# Patient Record
Sex: MALE | Race: WHITE | NOT HISPANIC OR LATINO | URBAN - METROPOLITAN AREA
[De-identification: names, ages, dates, MRNs, and addresses within clinical notes are randomized per-mention and may not be internally consistent; named-entity substitution may affect disease eponyms.]

---

## 2022-12-25 ENCOUNTER — HOSPITAL ENCOUNTER (EMERGENCY)
Facility: MEDICAL CENTER | Age: 47
End: 2022-12-26
Attending: EMERGENCY MEDICINE
Payer: COMMERCIAL

## 2022-12-25 DIAGNOSIS — M79.644 FINGER PAIN, RIGHT: ICD-10-CM

## 2022-12-25 DIAGNOSIS — S61.210A LACERATION OF RIGHT INDEX FINGER WITHOUT FOREIGN BODY WITHOUT DAMAGE TO NAIL, INITIAL ENCOUNTER: ICD-10-CM

## 2022-12-25 PROCEDURE — 99283 EMERGENCY DEPT VISIT LOW MDM: CPT

## 2022-12-26 VITALS
RESPIRATION RATE: 16 BRPM | WEIGHT: 222.44 LBS | HEART RATE: 82 BPM | HEIGHT: 70 IN | TEMPERATURE: 98.2 F | SYSTOLIC BLOOD PRESSURE: 141 MMHG | DIASTOLIC BLOOD PRESSURE: 79 MMHG | BODY MASS INDEX: 31.85 KG/M2 | OXYGEN SATURATION: 95 %

## 2022-12-26 PROCEDURE — 700101 HCHG RX REV CODE 250: Performed by: EMERGENCY MEDICINE

## 2022-12-26 PROCEDURE — 304217 HCHG IRRIGATION SYSTEM

## 2022-12-26 RX ORDER — LIDOCAINE HYDROCHLORIDE 20 MG/ML
JELLY TOPICAL
Status: COMPLETED | OUTPATIENT
Start: 2022-12-26 | End: 2022-12-26

## 2022-12-26 RX ADMIN — LIDOCAINE HYDROCHLORIDE 1 ML: 20 JELLY TOPICAL at 00:29

## 2022-12-26 NOTE — ED PROVIDER NOTES
"ED Provider Note      CHIEF COMPLAINT  Chief Complaint   Patient presents with    Hand Laceration     Right index finger       HPI  Ranjeet Long is a 47 y.o. male who presents to the emergency room for evaluation of right index finger injury.  He was working on a car earlier tonight when part of his finger was grazed by metal inserted into a hitch.  He had a flap removed from the tip of his finger with some immediate pain, bleeding and they attempted to clean this, reapproximated the flap and came in for subsequent assessment.  Pain is minimal at this time, he has rough approximated flap replacement of the distal half centimeter of his right index finger with no involvement of the nail, no impediment in movement and no other acute traumatic injuries.  He is not on blood thinners, no other longstanding health problems.    Tetanus is not up-to-date, he is politely declining tetanus update here.    REVIEW OF SYSTEMS  No numbness or weakness    PAST MEDICAL HISTORY  History reviewed. No pertinent past medical history.    SOCIAL HISTORY  Social History     Tobacco Use    Smoking status: Former     Types: Cigarettes     Quit date: 1995     Years since quittin.0    Smokeless tobacco: Former     Types: Chew   Vaping Use    Vaping Use: Never used   Substance Use Topics    Alcohol use: Yes     Comment: 1-2 beers daily    Drug use: Never       CURRENT MEDICATIONS  Home Medications       Reviewed by Bradley Madsen R.N. (Registered Nurse) on 22 at 2330  Med List Status: Not Addressed     Medication Last Dose Status        Patient Oneal Taking any Medications                         ALLERGIES  No Known Allergies    PHYSICAL EXAM  VITAL SIGNS: BP (!) 166/98   Pulse 79   Temp 37.1 °C (98.7 °F) (Temporal)   Resp 18   Ht 1.765 m (5' 9.5\")   Wt 101 kg (222 lb 7.1 oz)   SpO2 96%   BMI 32.38 kg/m²    Constitutional: No distress  HENT:  Atraumatic   Eyes: Normal inspection  Cardiovascular: Normal heart " rate  Thorax & Lungs: No respiratory distress  Skin: Distal portion of the right index finger shows no pain or tenderness at the PIP or DIP, the distal half centimeter is avulsed in a circumferential 1 cm x 1 cm tip area has been replaced somewhat approximated at the flap.  There is some duskiness, there is some discoloration around the margins suggesting devascularization  Extremities: As mentioned above  Neurologic: Cutaneous numbness at the injury site, otherwise no impediment in flexion extension  Psychiatric: Affect normal      COURSE & MEDICAL DECISION MAKING  Patient is neurovascularly intact. No foreign bodies. No motor dysfunction.  There is a avulsed flap on the distal portion of the finger that does not expose underlying bone, there is a strong suggestion that this flap will ultimately be devascularized though it provides well approximated barrier for secondary infection.  Wound is cleaned, avulsed flap is easily placed back in the area of injury and l the avulsed flap is repaired per my note below.    Avulsion flap/Laceration Procedure Note  Indication: Laceration  Procedure: The patient was placed in the appropriate position and anesthesia around the laceration was obtained by infiltration using LET gel. The area was then irrigated with normal saline. The avulsion flap was already well approximated, closure discussed and pt prefers surgical tape.  There is so devascularized tissue and no further guarentee of revascularization with any closure technique.  Tape and flap easily approximated, finger extension splint placed. There were no additional lacerations requiring repair. The wound area was then dressed with a sterile dressing.    Tetanus declined per pt wishes  Total repaired wound length: 1.5 cm.     The patient tolerated the procedure well.    Patient instructed on the likelihood possible wound devascularization, and avulsion flap that has been reapproximated easily but may not have a stock of  vascular tissue to resupply it.  The irregular flap was easily reapproximated, underlying tissue is visualized and there is no exposed bone.  There is no abnormalities of the nail and at this time the wound was closed using Steri-Strips and placed in a finger extension splint to help promote further damage or injury.  He is updated regarding the likely healing course, the need for reassessment if he has fevers chills purulent discharge and the possibility that this flap may turn black or discolored though it is attached and will provide some element of additional skin barrier.  Questions are addressed and they are discharged home in stable condition.    FINAL IMPRESSION  Visit Diagnoses     ICD-10-CM   1. Laceration of right index finger without foreign body without damage to nail, initial encounter  S61.210A   2. Finger pain, right  M79.644       Blood pressure reviewed and likely elevated secondary to medical condition. Advised home monitoring and followup.      This dictation was created using voice recognition software. The accuracy of the dictation is limited to the abilities of the software. I expect there may be some errors of grammar and possibly content. The nursing notes were reviewed and certain aspects of this information were incorporated into this note.    Electronically signed by: Charlie Couch M.D., 12/26/2022 12:05 AM

## 2022-12-26 NOTE — ED TRIAGE NOTES
"Chief Complaint   Patient presents with    Hand Laceration     Right index finger     Patient ambulatory to triage with wife for the above complaint. Patient states that he was working on his moms car when he sliced the top of his right index. Patient placed on the skin back on the top of finger and dressing applied, bleeding controlled in triage.    Last TDAP over 20 years ago.  Patient declining Tetanus in triage.    Pt is alert and oriented, speaking in full sentences, follows commands and responds appropriately to questions. Resp are even and unlabored.      Pt placed in lobby. Pt educated on triage process. Pt encouraged to alert staff for any changes.     Patient and staff wearing appropriate PPE.    BP (!) 166/98   Pulse 79   Temp 37.1 °C (98.7 °F) (Temporal)   Resp 18   Ht 1.765 m (5' 9.5\")   Wt 101 kg (222 lb 7.1 oz)   SpO2 96%    "